# Patient Record
Sex: MALE | HISPANIC OR LATINO | URBAN - METROPOLITAN AREA
[De-identification: names, ages, dates, MRNs, and addresses within clinical notes are randomized per-mention and may not be internally consistent; named-entity substitution may affect disease eponyms.]

---

## 2019-06-03 ENCOUNTER — CONVERSION ENCOUNTER (OUTPATIENT)
Dept: URGENT CARE | Facility: CLINIC | Age: 24
End: 2019-06-03

## 2019-06-04 VITALS
RESPIRATION RATE: 16 BRPM | HEART RATE: 65 BPM | HEIGHT: 68 IN | SYSTOLIC BLOOD PRESSURE: 146 MMHG | BODY MASS INDEX: 27.13 KG/M2 | OXYGEN SATURATION: 99 % | WEIGHT: 179 LBS | DIASTOLIC BLOOD PRESSURE: 86 MMHG

## 2019-06-06 NOTE — PROGRESS NOTES
Visit Type:  Acute Visit    Chief Complaint:  chest pressure/ h/a.    History of Present Illness:  says chest discomfort and pounding   headache too says dizzy yesterday and today   says drinks a lot of alcohol , denies drug usage  no OTC meds  friend helps interpret      Vital Signs:    Patient Profile:    23 Years Old Male  Height:     68 inches  Weight:     179 pounds  BMI:        27.21     O2 Sat:     99 %  Temp:       146 degrees F oral  Pulse rate: 65 / minute  Resp:       16 per minute  BP Sittin / 86  (left arm)    Cuff size:  regular      Problems: Active problems were reviewed with the patient during this visit.  Medications: Medications were reviewed with the patient during this visit.  Allergies: Allergies were reviewed with the patient during this visit.  No Known Medication.  No Known Allergy.  No Known Drug Allergy.        Vitals Entered By: Roselia Petty (Beatriz  3, 2019 5:23 PM)    Current Allergies (reviewed today):  No known allergies    Current Medications (including medications started today):   None      Past Medical History:     Reviewed history and no changes required:        pt sts no PMH    Past Surgical History:     Reviewed history and no changes required:        pt sts no PSH    Family History Summary:      Reviewed history and no changes required: 2019  First Degree Blood Relative - Has No Known Family History - Entered On: 6/3/2019      Social History:     Reviewed history and no changes required:        Marital Status: Single        Children:        Occupation:        Risk Factors:     Smoked Tobacco Use:  Never smoker  Alcohol use:  yes     Type:  occi    Previous Tobacco Use:   Previous Alcohol Use:     Review of Systems   General: Complains of headache. Denies fevers, chills.   Cardiovascular: Complains of see HPI, palpitations. Denies dyspnea on exertion.   Respiratory: Denies cough, dyspnea.   Gastrointestinal: Denies nausea.   Skin: Denies rash, itching.    Neurologic: Complains of vertigo.   Allergic/Immunologic: All other systems reviewed and are negative         Physical Exam    General:      in some discomfort   Mouth:      Moist mucus membranes   Neck:      no masses, thyromegaly, or abnormal cervical nodes.    Lungs:      clear bilaterally to auscultation.  Normal resp effort   Heart:      regular rhythm and normal rate.    Skin:      intact without lesions or rashes.    Psych:      anxious.        Blood Pressure:  Today's BP: 146/86 mm Hg            Impression & Recommendations:    Problem # 1:  Chest pain, atypical (ICD-786.59) (UBI00-U07.89)  Assessment: New    Orders:  Ofc Vst, New Level II (69168)  EKG w Interpretation and Report (56204) incomplete RBBB   ?repolarisation abnormality        Patient Instructions:  1)  DD discussed with pt and his friend  2)  Pt opts to go to [facility]  3)  Offered ambulance - pt refused - AMA signed  4)  Friend will drive him  5)  Called ER - on hold for over 10 mins , EKG and all documents faxed to [facility]  6)  Follow up with Primary care Provider.  7)  Follow up care is your responsibility.  8)  Discussed at the end of the visit pertaining findings, results, treatment plan,  and follow up plan.       ]          Laceration/ Wound     Objective     cm  Assessment:     Plan:         Electronically signed by Yaniv Salguero MD on 06/03/2019 at 6:01 PM  ________________________________________________________________________       Disclaimer: Converted Note message may not contain all data elements that existed in the legacy source system. Please see [employer] Legacy System for the original note details.